# Patient Record
(demographics unavailable — no encounter records)

---

## 2024-10-14 NOTE — HISTORY OF PRESENT ILLNESS
[FreeTextEntry1] : 51M with HTN, LVH, DAH, brain aneurysm s/p clip ~ 10 yrs ago, L renal atrophy presenting for follow up of CKD and HTN. Pt is here with wife and grandchild. He last saw me in August. BP at home is labile 160-170s systolic. On last visit I increased aldactone to 50mg bid. HE has been taking all his medications. He has blurry vision always. No headaches. No blood in the urine, no dysuria, no LE swelling, no shortness of breath.

## 2024-10-14 NOTE — CURRENT MEDS
[TextEntry] : spironolactone 50mg po bid Furosemide 40mg labetalol 200mg po bid losartan 100mg po qd

## 2024-10-14 NOTE — PLAN
[TextEntry] : Will add carvedilol 3.125mg q 12hrs today. Continue aldactone 50mg bid, labetalol 200mg bid, and losartan 100mg. Also on furosemide 40mg po qd. Will check labs today for CKD. Enroll in HT program as pt will need dialysis education if GFR low. Will follow up in 2 months regarding BP and CKD.  Maria T wife [phone]

## 2024-11-22 NOTE — REASON FOR VISIT
[FreeTextEntry1] : Preferred to have his wife translate Shira) 51M with HTN, LVH, DAH, brain aneurysm s/p clip ~ 10 yrs ago, L renal atrophy presenting for follow up of CKD and HTN.   Overall patient is doing well. He denies any chest pain, shortness of breath, light headedness, dizziness, orthopnea, PND, or LE edema. He can exercise for 25 minutes before getting fatigued, but only gets short of breath after at least moderate exercise for several minutes, no recent change. He denies any syncope or presyncope. He takes his BP in the AM, and the SBP is 170-180's.   Meds: Labetalol 200 mgPO BID Spironolactone 50mg PO Daily (ordered as PO BID) Furosemide 40mg PO daily carvedilol 3.125mg PO BID Losartan 100mg PO daily.   MHx  Pshx: Clip for brain aneurysm Fmhx: Mother high blood pressure, brother  of a kidney issue ( unclear what is was)   TTE: 24    1. Left ventricular cavity is normal in size. Left ventricular wall thickness is normal. Left ventricular systolic function is normal with an ejection fraction of 63 % by Brown's method of disks. There are no regional wall motion abnormalities seen.  2. Left ventricular global longitudinal strain is -19.0 % which is normal (< -18%). Images were acquired on a [employer] ultrasound system and processed using [employer] strain analysis software with a heart rate of 60 bpm and a blood pressure of 161/83 mmHg.  3. Normal left ventricular diastolic function, with normal left ventricular filling pressure.  4. Normal right ventricular cavity size, with normal wall thickness, and normal right ventricular systolic function.  5. Normal left and right atrial size.  6. No significant valvular disease.  7. No pericardial effusion seen.  8. No prior echocardiogram is available for comparison.  ________________________________________________________________________________________ FINDINGS:   Left Ventricle: The left ventricular cavity is normal in size. Left ventricular wall thickness is normal. Left ventricular systolic function is normal with a calculated ejection fraction of 63 % by the Brown's biplane method of disks. There are no regional wall motion abnormalities seen. There is normal left ventricular diastolic function, with Left ventricular filling pressures are normal. Left ventricular global longitudinal strain is -19.0 % which is normal (< -18%). Images were acquired on a [employer] ultrasound system and processed using [employer] strain analysis software with a heart rate of 60 bpm and a blood pressure of 161/83 mmHg. LV Wall Scoring: All segments are normal.      Right Ventricle: The right ventricular cavity is normal in size, with normalwall thickness and right ventricular systolic function is normal. Tricuspid annular plane systolic excursion (TAPSE) is 1.7 cm (normal >=1.7 cm).   Left Atrium: The left atrium is normal in size. The left atrium is normal in size.   Right Atrium: The right atrium is normal in size.   Interatrial Septum: The interatrial septum appears intact.   Aortic Valve: The aortic valve is tricuspid with normal leaflet excursion. There is no aortic valve stenosis. There is no evidence of aortic regurgitation.   Mitral Valve: Structurally normal mitral valve with normal leaflet excursion. There is calcification of the mitral valve annulus. There is no mitral valve stenosis. There is trace mitral regurgitation.   Tricuspid Valve: Structurally normal tricuspid valve with normal leaflet excursion. There is trace tricuspid regurgitation.   Pulmonic Valve: Structurally normal pulmonic valve with normal leaflet excursion. There is trace pulmonic regurgitation.   Aorta: The aortic rootappears normal in size. The aortic root at the sinuses of Valsalva is normal in size, measuring 2.90 cm (indexed 1.62 cm/m). The ascending aorta is normal in size, measuring 3.10 cm (indexed 1.74 cm/m). The aortic arch diameter is normal in size, measuring 2.8 cm (indexed 1.57 cm/m).   Pericardium: No pericardial effusion seen.   Systemic Veins: The inferior vena cava is normal in size measuring 2.18 cm in diameter, (normal <2.1cm) with normal inspiratory collapse (normal >50%) consistent with normal right atrial pressure (~3, range 0-5mmHg). ____________________________________________________________________ QUANTITATIVE DATA: Left Ventricle Measurements: (Indexed to BSA)   IVSd (2D):   0.6 cm LVPWd (2D):  0.7 cm             Strain Measurements: LVIDd (2D):  4.8 cm                           Global Pk Long Strain:  -18.8 % LVIDs (2D):  2.9 cm                           Endo Pk Strain A4C:     -19.3 % LV Mass:     104 g  58.2 g/m                 Endo Pk Strain A2C:     -18.3 % BiPlane LV EF%: 63 %                          Endo Pk Strain A3C:     -18.9 %   MV E Vmax:    0.62 m/s MV A Vmax:    0.61 m/s MV E/A:       1.02 e' lateral:   9.37 cm/s e' medial:    5.43 cm/s E/e' lateral: 6.66 E/e' medial:  11.49 E/e' Average: 8.43  Aorta Measurements: (Normal range) (Indexed to BSA)   Ao Root d     2.90 cm (3.1 - 3.7 cm) 1.62 cm/m Ao Asc d, 2D: 3.10 Ao Asc prox:  3.10 cm                1.74 cm/m Ao Arch:      2.8 cm       Left AtriumMeasurements: (Indexed to BSA) LA Diam 2D: 4.00 cm     Right Ventricle Measurements:   TAPSE:           1.7 cm RV Base (RVID1): 3.6 cm RV Mid (RVID2):  2.6 cm    LVOT / RVOT/ Qp/Qs Data: (Indexed to BSA) LVOT Diameter:  2.00 cm LVOT Area:      3.14 cm LVOT Vmax:      1.29 m/s LVOT Vmn:       0.791 m/s LVOT VTI:       27.70 cm LVOT peak grad: 7 mmHg LVOT mean grad: 3.0 mmHg LVOT SV:        87.0 ml   48.71 ml/m  Mitral Valve Measurements:   MV E Vmax: 0.6 m/s MV A Vmax: 0.6 m/s MV E/A:    1.0    Tricuspid Valve Measurements:   RA Pressure: 3 mmHg

## 2024-11-22 NOTE — REASON FOR VISIT
[FreeTextEntry1] : Preferred to have his wife translate Shira) 51M with HTN, LVH, DAH, brain aneurysm s/p clip ~ 10 yrs ago, L renal atrophy presenting for follow up of CKD and HTN.   Overall patient is doing well. He denies any chest pain, shortness of breath, light headedness, dizziness, orthopnea, PND, or LE edema. He can exercise for 25 minutes before getting fatigued, but only gets short of breath after at least moderate exercise for several minutes, no recent change. He denies any syncope or presyncope. He takes his BP in the AM, and the SBP is 170-180's.   Meds: Labetalol 200 mgPO BID Spironolactone 50mg PO Daily (ordered as PO BID) Furosemide 40mg PO daily carvedilol 3.125mg PO BID Losartan 100mg PO daily.   MHx  Pshx: Clip for brain aneurysm Fmhx: Mother high blood pressure, brother  of a kidney issue ( unclear what is was)   TTE: 24    1. Left ventricular cavity is normal in size. Left ventricular wall thickness is normal. Left ventricular systolic function is normal with an ejection fraction of 63 % by Brown's method of disks. There are no regional wall motion abnormalities seen.  2. Left ventricular global longitudinal strain is -19.0 % which is normal (< -18%). Images were acquired on a [employer] ultrasound system and processed using [employer] strain analysis software with a heart rate of 60 bpm and a blood pressure of 161/83 mmHg.  3. Normal left ventricular diastolic function, with normal left ventricular filling pressure.  4. Normal right ventricular cavity size, with normal wall thickness, and normal right ventricular systolic function.  5. Normal left and right atrial size.  6. No significant valvular disease.  7. No pericardial effusion seen.  8. No prior echocardiogram is available for comparison.  ________________________________________________________________________________________ FINDINGS:   Left Ventricle: The left ventricular cavity is normal in size. Left ventricular wall thickness is normal. Left ventricular systolic function is normal with a calculated ejection fraction of 63 % by the Brown's biplane method of disks. There are no regional wall motion abnormalities seen. There is normal left ventricular diastolic function, with Left ventricular filling pressures are normal. Left ventricular global longitudinal strain is -19.0 % which is normal (< -18%). Images were acquired on a [employer] ultrasound system and processed using [employer] strain analysis software with a heart rate of 60 bpm and a blood pressure of 161/83 mmHg. LV Wall Scoring: All segments are normal.      Right Ventricle: The right ventricular cavity is normal in size, with normalwall thickness and right ventricular systolic function is normal. Tricuspid annular plane systolic excursion (TAPSE) is 1.7 cm (normal >=1.7 cm).   Left Atrium: The left atrium is normal in size. The left atrium is normal in size.   Right Atrium: The right atrium is normal in size.   Interatrial Septum: The interatrial septum appears intact.   Aortic Valve: The aortic valve is tricuspid with normal leaflet excursion. There is no aortic valve stenosis. There is no evidence of aortic regurgitation.   Mitral Valve: Structurally normal mitral valve with normal leaflet excursion. There is calcification of the mitral valve annulus. There is no mitral valve stenosis. There is trace mitral regurgitation.   Tricuspid Valve: Structurally normal tricuspid valve with normal leaflet excursion. There is trace tricuspid regurgitation.   Pulmonic Valve: Structurally normal pulmonic valve with normal leaflet excursion. There is trace pulmonic regurgitation.   Aorta: The aortic rootappears normal in size. The aortic root at the sinuses of Valsalva is normal in size, measuring 2.90 cm (indexed 1.62 cm/m). The ascending aorta is normal in size, measuring 3.10 cm (indexed 1.74 cm/m). The aortic arch diameter is normal in size, measuring 2.8 cm (indexed 1.57 cm/m).   Pericardium: No pericardial effusion seen.   Systemic Veins: The inferior vena cava is normal in size measuring 2.18 cm in diameter, (normal <2.1cm) with normal inspiratory collapse (normal >50%) consistent with normal right atrial pressure (~3, range 0-5mmHg). ____________________________________________________________________ QUANTITATIVE DATA: Left Ventricle Measurements: (Indexed to BSA)   IVSd (2D):   0.6 cm LVPWd (2D):  0.7 cm             Strain Measurements: LVIDd (2D):  4.8 cm                           Global Pk Long Strain:  -18.8 % LVIDs (2D):  2.9 cm                           Endo Pk Strain A4C:     -19.3 % LV Mass:     104 g  58.2 g/m                 Endo Pk Strain A2C:     -18.3 % BiPlane LV EF%: 63 %                          Endo Pk Strain A3C:     -18.9 %   MV E Vmax:    0.62 m/s MV A Vmax:    0.61 m/s MV E/A:       1.02 e' lateral:   9.37 cm/s e' medial:    5.43 cm/s E/e' lateral: 6.66 E/e' medial:  11.49 E/e' Average: 8.43  Aorta Measurements: (Normal range) (Indexed to BSA)   Ao Root d     2.90 cm (3.1 - 3.7 cm) 1.62 cm/m Ao Asc d, 2D: 3.10 Ao Asc prox:  3.10 cm                1.74 cm/m Ao Arch:      2.8 cm       Left AtriumMeasurements: (Indexed to BSA) LA Diam 2D: 4.00 cm     Right Ventricle Measurements:   TAPSE:           1.7 cm RV Base (RVID1): 3.6 cm RV Mid (RVID2):  2.6 cm    LVOT / RVOT/ Qp/Qs Data: (Indexed to BSA) LVOT Diameter:  2.00 cm LVOT Area:      3.14 cm LVOT Vmax:      1.29 m/s LVOT Vmn:       0.791 m/s LVOT VTI:       27.70 cm LVOT peak grad: 7 mmHg LVOT mean grad: 3.0 mmHg LVOT SV:        87.0 ml   48.71 ml/m  Mitral Valve Measurements:   MV E Vmax: 0.6 m/s MV A Vmax: 0.6 m/s MV E/A:    1.0    Tricuspid Valve Measurements:   RA Pressure: 3 mmHg

## 2024-11-22 NOTE — ASSESSMENT
[FreeTextEntry1] : 51M with HTN, LVH, , brain aneurysm s/p clip ~ 10 yrs ago, L renal ?atrophy (congenital) presenting for follow up of CKD and HTN  #HTN Has had CT scan and renal duplex to evaluate for adenoma and renal artery stenosis. The US notes left renal atrophy, no mention of assessment of left renal artery (unclear if this was possible, or not seen).I discussed the imaging with the radiologist who stated that the imaging is more consistent with a congenital abnormality and not consistent with CECILIA. Thus, there is no evidence of renal artery stenosis -Renin/adlo, metanephrines, and cortisol negative, checked in 2023  -EKG sinus with LVH -TTE with normal biventricular function.  -c/w Losartan 100mg once daily -Increase Labetalol to 200mg TID (from 200BID) -c/w Furosemide 40mg qd -Spironolactone 50mg daily (ordered as BID, but he has been taking daily) -start amlodipine 5mg PO daily.  -Patient was recently started on coreg, but we prefer to avoid double beta blockade, and thus advised the patient to stop the coreg. Discussed with Nephrology, Dr. Mccormack -reinforced low salt/low fat diet. -sleep study (STOP bang questionnaire= received 3 points for male, neck circumference and age)  -Patient to keep BP log TID   #Adrenal Hypertrophy -Ordered MRI of adrenal gland -renal/adriel nml as above   RTC in 1-2 weeks for BP med  titration.  Case Discussed with Dr. Hennessy

## 2024-12-30 NOTE — PHYSICAL EXAM
[General Appearance - Alert] : alert [General Appearance - In No Acute Distress] : in no acute distress [Sclera] : the sclera and conjunctiva were normal [PERRL With Normal Accommodation] : pupils were equal in size, round, and reactive to light [Extraocular Movements] : extraocular movements were intact [Outer Ear] : the ears and nose were normal in appearance [Oropharynx] : the oropharynx was normal [Neck Appearance] : the appearance of the neck was normal [Neck Cervical Mass (___cm)] : no neck mass was observed [Jugular Venous Distention Increased] : there was no jugular-venous distention [Thyroid Diffuse Enlargement] : the thyroid was not enlarged [Thyroid Nodule] : there were no palpable thyroid nodules [Auscultation Breath Sounds / Voice Sounds] : lungs were clear to auscultation bilaterally [Heart Rate And Rhythm] : heart rate was normal and rhythm regular [Heart Sounds] : normal S1 and S2 [Heart Sounds Gallop] : no gallops [Murmurs] : no murmurs [Heart Sounds Pericardial Friction Rub] : no pericardial rub [Full Pulse] : the pedal pulses are present [Edema] : there was no peripheral edema [Bowel Sounds] : normal bowel sounds [Abdomen Soft] : soft [Abdomen Tenderness] : non-tender [Abdomen Mass (___ Cm)] : no abdominal mass palpated [Abnormal Walk] : normal gait [Musculoskeletal - Swelling] : no joint swelling seen [Nail Clubbing] : no clubbing  or cyanosis of the fingernails [Motor Tone] : muscle strength and tone were normal [Skin Color & Pigmentation] : normal skin color and pigmentation [Skin Turgor] : normal skin turgor [] : no rash [Deep Tendon Reflexes (DTR)] : deep tendon reflexes were 2+ and symmetric [Sensation] : the sensory exam was normal to light touch and pinprick [No Focal Deficits] : no focal deficits [Oriented To Time, Place, And Person] : oriented to person, place, and time [Impaired Insight] : insight and judgment were intact [Affect] : the affect was normal

## 2024-12-30 NOTE — PHYSICAL EXAM
[General Appearance - Alert] : alert [General Appearance - In No Acute Distress] : in no acute distress [Sclera] : the sclera and conjunctiva were normal [PERRL With Normal Accommodation] : pupils were equal in size, round, and reactive to light [Extraocular Movements] : extraocular movements were intact [Outer Ear] : the ears and nose were normal in appearance [Oropharynx] : the oropharynx was normal [Neck Appearance] : the appearance of the neck was normal [Neck Cervical Mass (___cm)] : no neck mass was observed [Jugular Venous Distention Increased] : there was no jugular-venous distention [Thyroid Diffuse Enlargement] : the thyroid was not enlarged [Thyroid Nodule] : there were no palpable thyroid nodules [Auscultation Breath Sounds / Voice Sounds] : lungs were clear to auscultation bilaterally [Heart Rate And Rhythm] : heart rate was normal and rhythm regular [Heart Sounds Gallop] : no gallops [Heart Sounds] : normal S1 and S2 [Murmurs] : no murmurs [Heart Sounds Pericardial Friction Rub] : no pericardial rub [Full Pulse] : the pedal pulses are present [Edema] : there was no peripheral edema [Bowel Sounds] : normal bowel sounds [Abdomen Soft] : soft [Abdomen Tenderness] : non-tender [Abdomen Mass (___ Cm)] : no abdominal mass palpated [Abnormal Walk] : normal gait [Musculoskeletal - Swelling] : no joint swelling seen [Nail Clubbing] : no clubbing  or cyanosis of the fingernails [Motor Tone] : muscle strength and tone were normal [Skin Color & Pigmentation] : normal skin color and pigmentation [Skin Turgor] : normal skin turgor [] : no rash [Deep Tendon Reflexes (DTR)] : deep tendon reflexes were 2+ and symmetric [Sensation] : the sensory exam was normal to light touch and pinprick [No Focal Deficits] : no focal deficits [Oriented To Time, Place, And Person] : oriented to person, place, and time [Impaired Insight] : insight and judgment were intact [Affect] : the affect was normal

## 2024-12-31 NOTE — PLAN
[TextEntry] : Pt with elevated BP today. Will increase amlodipine to 5mg bid. Will cont other meds. Pt will need to have MRI but has not heard back regarding this, will follow up with cardiology. Low salt diet, monitor BP at home. Check CKD labs today.  Follow up in ~ 2 months

## 2024-12-31 NOTE — HISTORY OF PRESENT ILLNESS
[FreeTextEntry1] : 51M with HTN, LVH, DAH, brain aneurysm s/p clip ~ 10 yrs ago, L renal atrophy presenting for follow up of CKD and HTN. Pt is here with wife and grandchild. He last saw me in August. BP at home is labile 160-170s systolic. On previous visit I increased aldactone to 50mg bid but he is only taking it daily. He has been taking all his medications. He saw cardiology and his coreg was stopped and labetalol was increased to 200mg TID.  He has blurry vision always. No headaches. No blood in the urine, no dysuria, no LE swelling, no shortness of breath.

## 2024-12-31 NOTE — CURRENT MEDS
[TextEntry] : Labetalol 200 mg PO TID Spironolactone 50mg PO Daily  Furosemide 40mg PO daily Losartan 100mg PO daily. amlodipine 5mg po qd

## 2025-03-24 NOTE — CURRENT MEDS
[TextEntry] : Labetalol 200 mg PO TID Spironolactone 50mg PO Daily Furosemide 40mg PO daily Losartan 100mg PO daily. amlodipine 5mg po bid carvedilol 3.125mg po bid atorvastatin 80mg po qd

## 2025-03-24 NOTE — PLAN
[TextEntry] : Previously we had tried to enroll pt in HT program but unable to, will try to do so again. Pt with uncontrolled HTN today. Will increase labetalol to 400mg bid. Stop carvedilol. Will check CKD labs today. Will have to work on diet as well. Pt with GFR ~ 24 consistently.  Will follow up in 6-8 wks.

## 2025-03-24 NOTE — HISTORY OF PRESENT ILLNESS
[FreeTextEntry1] : 51M with HTN, LVH, DAH, brain aneurysm s/p clip ~ 10 yrs ago, L renal atrophy presenting for follow up of CKD and HTN. Pt is here with wife and grandchild. He last saw me in August. BP at home is labile 160-170s systolic. On previous visit I increased amlodipine to 5mg bid. He has been taking all his medications. He saw cardiology and his coreg was stopped and labetalol was increased to 200mg TID.  He has blurry vision always. No headaches. No blood in the urine, no dysuria, no LE swelling, no shortness of breath.